# Patient Record
Sex: MALE | Race: WHITE | NOT HISPANIC OR LATINO | URBAN - METROPOLITAN AREA
[De-identification: names, ages, dates, MRNs, and addresses within clinical notes are randomized per-mention and may not be internally consistent; named-entity substitution may affect disease eponyms.]

---

## 2022-11-17 ENCOUNTER — OFFICE (OUTPATIENT)
Dept: URBAN - METROPOLITAN AREA CLINIC 79 | Facility: CLINIC | Age: 32
End: 2022-11-17

## 2022-11-17 VITALS
HEART RATE: 76 BPM | SYSTOLIC BLOOD PRESSURE: 119 MMHG | HEIGHT: 68 IN | DIASTOLIC BLOOD PRESSURE: 86 MMHG | WEIGHT: 174 LBS | TEMPERATURE: 97.6 F

## 2022-11-17 DIAGNOSIS — K59.09 OTHER CONSTIPATION: ICD-10-CM

## 2022-11-17 PROCEDURE — 99204 OFFICE O/P NEW MOD 45 MIN: CPT

## 2022-11-17 NOTE — SERVICEHPINOTES
MURALI SETHI   is a   32   year old male who is being seen in consultation at the request of   MOUNIKA ZHANG   for constipation. Patient reports this is a chronic problem. He recalls having constipation since childhood. He moves bowels every 5-6 days on average, type 1-3 BSS. He will have a little loose stool along with type 1 stool occasionally. There is no abdominal pain assoc w lack of BMs, just feels "bloated". He endorses frequent straining. Mild rectal pain with passage of harder stools only, also tiny amounts of brb during these times, but very sporadic. He has tried Miralax daily for weeks at a time without any improvement. Drinks ~45 oz/water/day and thinks he gets plenty of fiber in diet ~15-70g/day his dietitian has suggested cutting back some. Denies fevers, chills, night sweats, unintentional weight loss.br
br --8/2022 Labs: CBC, CMP, thyroid panel = wnl.
brNo first degree relatives with crc.br